# Patient Record
Sex: FEMALE | Race: WHITE | NOT HISPANIC OR LATINO | Employment: STUDENT | URBAN - METROPOLITAN AREA
[De-identification: names, ages, dates, MRNs, and addresses within clinical notes are randomized per-mention and may not be internally consistent; named-entity substitution may affect disease eponyms.]

---

## 2019-05-07 ENCOUNTER — OFFICE VISIT (OUTPATIENT)
Dept: GASTROENTEROLOGY | Facility: CLINIC | Age: 19
End: 2019-05-07

## 2019-05-07 VITALS
BODY MASS INDEX: 16.26 KG/M2 | WEIGHT: 101.2 LBS | HEIGHT: 66 IN | SYSTOLIC BLOOD PRESSURE: 116 MMHG | DIASTOLIC BLOOD PRESSURE: 68 MMHG

## 2019-05-07 DIAGNOSIS — K59.00 CONSTIPATION, UNSPECIFIED CONSTIPATION TYPE: ICD-10-CM

## 2019-05-07 DIAGNOSIS — R19.7 DIARRHEA, UNSPECIFIED TYPE: ICD-10-CM

## 2019-05-07 DIAGNOSIS — R10.84 GENERALIZED ABDOMINAL PAIN: Primary | ICD-10-CM

## 2019-05-07 DIAGNOSIS — R63.4 WEIGHT LOSS, ABNORMAL: ICD-10-CM

## 2019-05-07 PROCEDURE — 99204 OFFICE O/P NEW MOD 45 MIN: CPT | Performed by: INTERNAL MEDICINE

## 2019-05-07 RX ORDER — DICYCLOMINE HCL 20 MG
20 TABLET ORAL
Qty: 90 TABLET | Refills: 5 | Status: SHIPPED | OUTPATIENT
Start: 2019-05-07 | End: 2020-12-09

## 2019-05-07 NOTE — PROGRESS NOTES
"    PATIENT INFORMATION  Valerie Minor       - 2000    CHIEF COMPLAINT  Chief Complaint   Patient presents with   • Abdominal Pain   • Constipation   • Diarrhea       HISTORY OF PRESENT ILLNESS  HPI    20 yo with one month of intermittent crampy abdominal pain, diarrhea and blood in stool. She states she has had \"stomach issues my whole life\".  She was seen and evaluated by Dr. Roberts about a year ago for similar symptoms except for the blood in the stool.  She underwent an upper endoscopy and a colonoscopy last year.  This was essentially normal.  Random colon biopsies were normal and her small bowel biopsies were normal also.  She notes postprandial abdominal pain usually within minutes of eating.  Her BMI is 16.3 and was around the same range last year.  She states she eats normal but when getting a diet history it appears that she eats very little at times just an apple a day.  She did not follow-up with Dr. Roberts after her scopes.  She states she is just dealt with her symptoms.  In the past several months she is lost another 10 pounds.  There is some associated nausea but no vomiting.  Put on abx for sinus infection about one month ago.  Labs from pcp reviewed done yesterday:  Wbc elevated at 13.8,  Sed rate normal, tsh and celiac are normal. Liver enzymes are normal.  She denies any family history for colon cancer polyps or inflammatory bowel disease.  REVIEW OF SYSTEMS  Review of Systems   Constitutional: Positive for appetite change, fatigue and unexpected weight change.   HENT: Positive for postnasal drip, rhinorrhea and sinus pressure.    Gastrointestinal: Positive for abdominal distention, abdominal pain, anal bleeding, blood in stool, constipation, diarrhea, nausea, rectal pain and vomiting.   Musculoskeletal: Positive for back pain.   Allergic/Immunologic: Positive for food allergies.   All other systems reviewed and are negative.        ACTIVE PROBLEMS  Patient Active Problem List    " "Diagnosis   • Generalized abdominal pain [R10.84]   • Diarrhea [R19.7]   • Constipation [K59.00]         PAST MEDICAL HISTORY  History reviewed. No pertinent past medical history.      SURGICAL HISTORY  Past Surgical History:   Procedure Laterality Date   • COLONOSCOPY     • UPPER GASTROINTESTINAL ENDOSCOPY           FAMILY HISTORY  Family History   Problem Relation Age of Onset   • Colon cancer Neg Hx    • Colon polyps Neg Hx          SOCIAL HISTORY  Social History     Occupational History   • Not on file   Tobacco Use   • Smoking status: Never Smoker   • Smokeless tobacco: Never Used   Substance and Sexual Activity   • Alcohol use: Yes   • Drug use: Yes     Types: Marijuana   • Sexual activity: Not on file       Debilities/Disabilities Identified: None    Emotional Behavior: Appropriate    CURRENT MEDICATIONS    Current Outpatient Medications:   •  dicyclomine (BENTYL) 20 MG tablet, Take 1 tablet by mouth 3 (Three) Times a Day Before Meals., Disp: 90 tablet, Rfl: 5    ALLERGIES  Patient has no known allergies.    VITALS  Vitals:    05/07/19 1310   BP: 116/68   Weight: 45.9 kg (101 lb 3.2 oz)   Height: 167.6 cm (66\")       LAST RESULTS   No results found for any previous visit.     No results found.    PHYSICAL EXAM  Physical Exam   Constitutional: She is oriented to person, place, and time. She appears well-developed and well-nourished. No distress.   Very thin framed female   HENT:   Head: Normocephalic and atraumatic.   Mouth/Throat: Oropharynx is clear and moist.   Eyes: EOM are normal. Pupils are equal, round, and reactive to light.   Neck: Normal range of motion. No tracheal deviation present.   Cardiovascular: Normal rate, regular rhythm, normal heart sounds and intact distal pulses. Exam reveals no gallop and no friction rub.   No murmur heard.  Pulmonary/Chest: Effort normal and breath sounds normal. No stridor. No respiratory distress. She has no wheezes. She has no rales. She exhibits no tenderness. "   Abdominal: Soft. Bowel sounds are normal. She exhibits no distension. There is no tenderness. There is no rebound and no guarding.   Musculoskeletal: She exhibits no edema.   Lymphadenopathy:     She has no cervical adenopathy.   Neurological: She is alert and oriented to person, place, and time.   Skin: Skin is warm. She is not diaphoretic.   Psychiatric: She has a normal mood and affect. Her behavior is normal. Judgment and thought content normal.   Nursing note and vitals reviewed.      ASSESSMENT  Diagnoses and all orders for this visit:    Generalized abdominal pain  -     Cancel: High Sensitivity CRP  -     Cancel: Celiac Ab tTG DGP TIgA  -     Cancel: TSH  -     Stool Culture - Stool, Per Rectum  -     Cancel: Fecal Leukocytes - Stool, Per Rectum  -     Clostridium Difficile Toxin - Stool, Per Rectum  -     Clostridium Difficile Toxin, PCR - Stool, Per Rectum    Diarrhea, unspecified type  -     Cancel: High Sensitivity CRP  -     Cancel: Celiac Ab tTG DGP TIgA  -     Cancel: TSH  -     Stool Culture - Stool, Per Rectum  -     Cancel: Fecal Leukocytes - Stool, Per Rectum  -     Clostridium Difficile Toxin - Stool, Per Rectum  -     Clostridium Difficile Toxin, PCR - Stool, Per Rectum    Constipation, unspecified constipation type    Weight loss, abnormal    Other orders  -     dicyclomine (BENTYL) 20 MG tablet; Take 1 tablet by mouth 3 (Three) Times a Day Before Meals.          PLAN  No Follow-up on file.    Ensure tid    Stool studies    If her stool studies are negative we will need to repeat an upper endoscopy and a colonoscopy.

## 2019-05-09 ENCOUNTER — LAB (OUTPATIENT)
Dept: LAB | Facility: HOSPITAL | Age: 19
End: 2019-05-09

## 2019-05-09 DIAGNOSIS — R10.84 ABDOMINAL PAIN, GENERALIZED: Primary | ICD-10-CM

## 2019-05-09 DIAGNOSIS — R19.7 DIARRHEA OF PRESUMED INFECTIOUS ORIGIN: ICD-10-CM

## 2019-05-09 LAB
C DIFF TOX GENS STL QL NAA+PROBE: NEGATIVE
LACTOFERRIN STL QL LA: POSITIVE

## 2019-05-09 PROCEDURE — 83631 LACTOFERRIN FECAL (QUANT): CPT

## 2019-05-09 PROCEDURE — 87493 C DIFF AMPLIFIED PROBE: CPT | Performed by: INTERNAL MEDICINE

## 2019-05-09 PROCEDURE — 87046 STOOL CULTR AEROBIC BACT EA: CPT | Performed by: INTERNAL MEDICINE

## 2019-05-09 PROCEDURE — 87045 FECES CULTURE AEROBIC BACT: CPT | Performed by: INTERNAL MEDICINE

## 2019-05-10 NOTE — PROGRESS NOTES
Let her know fecal lactoferirn is positive, cl.diff is negative. Cultures are pending.. If negative, she needs to have egd and cls.

## 2019-05-13 ENCOUNTER — TELEPHONE (OUTPATIENT)
Dept: GASTROENTEROLOGY | Facility: CLINIC | Age: 19
End: 2019-05-13

## 2019-05-13 DIAGNOSIS — R19.7 DIARRHEA, UNSPECIFIED TYPE: ICD-10-CM

## 2019-05-13 DIAGNOSIS — R10.84 GENERALIZED ABDOMINAL PAIN: Primary | ICD-10-CM

## 2019-05-13 DIAGNOSIS — K59.00 CONSTIPATION, UNSPECIFIED CONSTIPATION TYPE: ICD-10-CM

## 2019-05-13 LAB
CAMPYLOBACTER STL CULT: NORMAL
E COLI SXT STL QL IA: NEGATIVE
Lab: NORMAL
Lab: NORMAL
SALM + SHIG STL CULT: NORMAL

## 2019-05-13 NOTE — TELEPHONE ENCOUNTER
Notes recorded by Arely Rubio MD on 5/13/2019 at 9:18 AM EDT  C.diff is negative, stool cx is negative, needs egd and cls.  Patient requested gatorade prep       Date: ____5/24/19_________ Arrival Time: ____10:15am_____    Hospital:  [facility]([address] [place], [place] [address], [place] [address]) (back of hospital at the emergency room)           Please buy the following:  • One 64oz or two 32oz bottle(s) of Gatorade or any other non-carbonated drink (NO RED OR PURPLE). You may buy sugar-free if you are diabetic. You may refrigerate if preferred.   • Dulcolax tablets (not suppository or stool softener - will need 6 tablets).  • Yelena lax 238 grams (8.3oz) powder or generic form polyethylene glycol 3350.  • One bottle of Infants’ Mylicon liquid ask pharmacist for substitute if not available.  SEVEN DAYS BEFORE STOP ASPIRIN, ADVIL, ALEVE, MOTRIN, IBU or anything listed on the back of this form.  The day prior to colonoscopy, you will need to follow a clear liquid diet.   Clear liquid diet requirements:  You may consume water, fruit juices, jello, clear broth or bouillon, popsicles, Sprite, sports drinks, etc. (no orange, grapefruit or V-8). Please consume plenty of clear liquids. AVOID: All solid foods, dairy products and anything red or purple. Limit coffee and tea.  The day prior to colonoscopy, begin prep as detailed below:  1) In AM, mix all Yelena lax, all Gatorade and 3 milliliters of the Mylicon drops (.3 x 10=3ml) Stir/shake contents until completely dissolved. Chill if preferred. DO NOT DRINK YET.  2) At 9AM, take 3 tablets of Dulcolax pills with a large glass of water.  3) At 11AM, start drinking one 8oz glass of the Gatorade/Yelena lax/Mylicon solution every 15 minutes until half of solution is gone.   4) At 5PM, drink other half of solution by drinking an 8oz glass every 15 minutes.  5) At 6PM, take last 3 tablets with a large glass of water.  6) You may have liquids up to 4 hours prior.  You may take your  morning heart, blood pressure, seizure, psych and breathing medications with a small sip of water. No gum or candy. No smoking or tobacco products  7) You will need someone to drive you home after your exam. No bus or uber allowed. The average time spent is around 2-3 hours. You are not to drive, operate machinery or make legal decisions the remainder of the day.  Helpful tips:  • Some people may develop nausea/vomiting during this prep. The best option for this is to stop drinking the solution for about 30 minutes, then resume drinking at a slower rate. It is important to drink entire contents of solution.  • Walking in between drinking each glass reduces bloating.  • If diabetic, use sugar-free drinks and monitor blood sugar closely to prevent low blood sugar.      Please call the office the morning of your procedure if your bowel movements are still solid. [phone] Jessica. If it is after hours or the weekend and you need to reach the provider or the office please call [phone].  Please call the office as soon as possible if you need to reschedule or cancel your procedure you must give two weeks’ notice.  If you do not show up or frequently reschedule your procedure your provider has the option of not rescheduling.   It is your responsibility to check with your insurance company to determine benefits and out of pocket costs.     Avoid these medications 7 days prior to surgery  Please check with your prescribing doctor before stopping any medications    NSAIDS- Advil, Aleve, Motrin, Ibuprofen, Midol, Excedrin, Fiorinal, Ayala-Wyatt  (Some cold medications may have these in them)    All herbal medications- iron, vitamin E and D, fish oil, decongestants (phenylephrine, pseudoephedrine), ginkgo, garlic, ginseng, Eladio’s wart    Mobic (meloxicam), Celebrex, Diclofenac (Voltaren), Nambumetone (Relafen), Daypro, Naproxen, Sulindac, Indomethacin, Toradol, Feldine, Salsalate, Etodolac (Lodine),     Viagra,  Cialis, Levitra    Aspirin, Plavix (clopidogrel), Effient, Pletal, Coumadin, Pradaxa, Brilinta, Ticlide, Eliquis, (Xaralto- 3 days)      Diet pills -Adipex (phentermine) -2 weeks prior

## 2019-05-23 ENCOUNTER — ANESTHESIA EVENT (OUTPATIENT)
Dept: PERIOP | Facility: HOSPITAL | Age: 19
End: 2019-05-23

## 2019-05-24 ENCOUNTER — HOSPITAL ENCOUNTER (OUTPATIENT)
Facility: HOSPITAL | Age: 19
Setting detail: HOSPITAL OUTPATIENT SURGERY
Discharge: HOME | End: 2019-05-24
Attending: INTERNAL MEDICINE | Admitting: INTERNAL MEDICINE

## 2019-05-24 ENCOUNTER — ANESTHESIA (OUTPATIENT)
Dept: PERIOP | Facility: HOSPITAL | Age: 19
End: 2019-05-24

## 2019-05-24 VITALS
BODY MASS INDEX: 15.67 KG/M2 | TEMPERATURE: 97.2 F | DIASTOLIC BLOOD PRESSURE: 75 MMHG | SYSTOLIC BLOOD PRESSURE: 120 MMHG | HEIGHT: 66 IN | OXYGEN SATURATION: 95 % | HEART RATE: 71 BPM | RESPIRATION RATE: 16 BRPM | WEIGHT: 97.5 LBS

## 2019-05-24 DIAGNOSIS — K59.00 CONSTIPATION, UNSPECIFIED CONSTIPATION TYPE: ICD-10-CM

## 2019-05-24 DIAGNOSIS — R10.84 GENERALIZED ABDOMINAL PAIN: ICD-10-CM

## 2019-05-24 DIAGNOSIS — R19.7 DIARRHEA, UNSPECIFIED TYPE: ICD-10-CM

## 2019-05-24 PROCEDURE — 88305 TISSUE EXAM BY PATHOLOGIST: CPT | Performed by: INTERNAL MEDICINE

## 2019-05-24 PROCEDURE — 45380 COLONOSCOPY AND BIOPSY: CPT | Performed by: INTERNAL MEDICINE

## 2019-05-24 PROCEDURE — 43239 EGD BIOPSY SINGLE/MULTIPLE: CPT | Performed by: INTERNAL MEDICINE

## 2019-05-24 PROCEDURE — 25010000002 PROPOFOL 10 MG/ML EMULSION: Performed by: NURSE ANESTHETIST, CERTIFIED REGISTERED

## 2019-05-24 RX ORDER — SODIUM CHLORIDE 9 MG/ML
40 INJECTION, SOLUTION INTRAVENOUS AS NEEDED
Status: DISCONTINUED | OUTPATIENT
Start: 2019-05-24 | End: 2019-05-24 | Stop reason: HOSPADM

## 2019-05-24 RX ORDER — SERTRALINE HYDROCHLORIDE 25 MG/1
25 TABLET, FILM COATED ORAL DAILY
COMMUNITY
End: 2020-12-09

## 2019-05-24 RX ORDER — SODIUM CHLORIDE 0.9 % (FLUSH) 0.9 %
1-10 SYRINGE (ML) INJECTION AS NEEDED
Status: DISCONTINUED | OUTPATIENT
Start: 2019-05-24 | End: 2019-05-24 | Stop reason: HOSPADM

## 2019-05-24 RX ORDER — LIDOCAINE HYDROCHLORIDE 10 MG/ML
0.5 INJECTION, SOLUTION EPIDURAL; INFILTRATION; INTRACAUDAL; PERINEURAL ONCE AS NEEDED
Status: COMPLETED | OUTPATIENT
Start: 2019-05-24 | End: 2019-05-24

## 2019-05-24 RX ORDER — SODIUM CHLORIDE, SODIUM LACTATE, POTASSIUM CHLORIDE, CALCIUM CHLORIDE 600; 310; 30; 20 MG/100ML; MG/100ML; MG/100ML; MG/100ML
9 INJECTION, SOLUTION INTRAVENOUS CONTINUOUS
Status: DISCONTINUED | OUTPATIENT
Start: 2019-05-24 | End: 2019-05-24 | Stop reason: HOSPADM

## 2019-05-24 RX ORDER — GLYCOPYRROLATE 0.2 MG/ML
INJECTION INTRAMUSCULAR; INTRAVENOUS AS NEEDED
Status: DISCONTINUED | OUTPATIENT
Start: 2019-05-24 | End: 2019-05-24 | Stop reason: SURG

## 2019-05-24 RX ORDER — PROPOFOL 10 MG/ML
VIAL (ML) INTRAVENOUS CONTINUOUS PRN
Status: DISCONTINUED | OUTPATIENT
Start: 2019-05-24 | End: 2019-05-24 | Stop reason: SURG

## 2019-05-24 RX ORDER — PROPOFOL 10 MG/ML
VIAL (ML) INTRAVENOUS AS NEEDED
Status: DISCONTINUED | OUTPATIENT
Start: 2019-05-24 | End: 2019-05-24 | Stop reason: SURG

## 2019-05-24 RX ORDER — LIDOCAINE HYDROCHLORIDE 10 MG/ML
INJECTION, SOLUTION INFILTRATION; PERINEURAL AS NEEDED
Status: DISCONTINUED | OUTPATIENT
Start: 2019-05-24 | End: 2019-05-24 | Stop reason: SURG

## 2019-05-24 RX ADMIN — PROPOFOL 50 MG: 10 INJECTION, EMULSION INTRAVENOUS at 10:58

## 2019-05-24 RX ADMIN — LIDOCAINE HYDROCHLORIDE 50 MG: 10 INJECTION, SOLUTION INFILTRATION; PERINEURAL at 10:32

## 2019-05-24 RX ADMIN — PROPOFOL 50 MG: 10 INJECTION, EMULSION INTRAVENOUS at 10:51

## 2019-05-24 RX ADMIN — PROPOFOL 50 MG: 10 INJECTION, EMULSION INTRAVENOUS at 10:45

## 2019-05-24 RX ADMIN — PROPOFOL 40 MG: 10 INJECTION, EMULSION INTRAVENOUS at 10:40

## 2019-05-24 RX ADMIN — PROPOFOL 100 MCG/KG/MIN: 10 INJECTION, EMULSION INTRAVENOUS at 10:33

## 2019-05-24 RX ADMIN — PROPOFOL 50 MG: 10 INJECTION, EMULSION INTRAVENOUS at 11:07

## 2019-05-24 RX ADMIN — PROPOFOL 50 MG: 10 INJECTION, EMULSION INTRAVENOUS at 10:38

## 2019-05-24 RX ADMIN — PROPOFOL 50 MG: 10 INJECTION, EMULSION INTRAVENOUS at 10:33

## 2019-05-24 RX ADMIN — SODIUM CHLORIDE, POTASSIUM CHLORIDE, SODIUM LACTATE AND CALCIUM CHLORIDE: 600; 310; 30; 20 INJECTION, SOLUTION INTRAVENOUS at 10:30

## 2019-05-24 RX ADMIN — LIDOCAINE HYDROCHLORIDE 0.5 ML: 10 INJECTION, SOLUTION EPIDURAL; INFILTRATION; INTRACAUDAL; PERINEURAL at 09:30

## 2019-05-24 RX ADMIN — PROPOFOL 40 MG: 10 INJECTION, EMULSION INTRAVENOUS at 11:01

## 2019-05-24 RX ADMIN — SODIUM CHLORIDE, POTASSIUM CHLORIDE, SODIUM LACTATE AND CALCIUM CHLORIDE 9 ML/HR: 600; 310; 30; 20 INJECTION, SOLUTION INTRAVENOUS at 09:30

## 2019-05-24 RX ADMIN — GLYCOPYRROLATE 0.1 MG: 0.2 INJECTION INTRAMUSCULAR; INTRAVENOUS at 10:33

## 2019-05-24 NOTE — ANESTHESIA POSTPROCEDURE EVALUATION
Patient: Valerie Minor    Procedure Summary     Date:  05/24/19 Room / Location:  [facility] ENDOSCOPY 2 /  [facility] OR    Anesthesia Start:  1030 Anesthesia Stop:  1111    Procedures:       ESOPHAGOGASTRODUODENOSCOPY with biopsies (N/A Esophagus)      COLONOSCOPY with biopsies (N/A ) Diagnosis:       Generalized abdominal pain      Diarrhea, unspecified type      Constipation, unspecified constipation type      (Generalized abdominal pain [R10.84])      (Diarrhea, unspecified type [R19.7])      (Constipation, unspecified constipation type [K59.00])    Surgeon:  Arely Rubio MD Provider:  Karel Gay CRNA    Anesthesia Type:  MAC ASA Status:  2          Anesthesia Type: MAC  Last vitals  BP   117/88 (05/24/19 1130)   Temp   97.2 °F (36.2 °C) (05/24/19 1116)   Pulse   81 (05/24/19 1130)   Resp   16 (05/24/19 1130)     SpO2   100 % (05/24/19 1130)     Post Anesthesia Care and Evaluation    Patient location during evaluation: PHASE II  Patient participation: complete - patient participated  Level of consciousness: awake  Pain management: adequate  Airway patency: patent  Anesthetic complications: No anesthetic complications  PONV Status: none  Cardiovascular status: acceptable  Respiratory status: acceptable  Hydration status: acceptable

## 2019-05-24 NOTE — ANESTHESIA PREPROCEDURE EVALUATION
Anesthesia Evaluation     Patient summary reviewed and Nursing notes reviewed   no history of anesthetic complications:  NPO Solid Status: > 8 hours  NPO Liquid Status: > 4 hours           Airway   Mallampati: I  TM distance: >3 FB  Neck ROM: full  No difficulty expected  Dental - normal exam     Comment: Braces    Pulmonary - normal exam    breath sounds clear to auscultation  (+) a smoker Current,   Cardiovascular - negative cardio ROS and normal exam    Rhythm: regular  Rate: normal        Neuro/Psych- negative ROS  GI/Hepatic/Renal/Endo      ROS Comment: IBS    Musculoskeletal (-) negative ROS    Abdominal  - normal exam   Substance History   (+) drug use (MJ last used 2 weeks ago)     OB/GYN negative ob/gyn ROS         Other - negative ROS                     Anesthesia Plan    ASA 2     MAC     intravenous induction   Anesthetic plan, all risks, benefits, and alternatives have been provided, discussed and informed consent has been obtained with: patient.  Use of blood products discussed with patient  Consented to blood products.

## 2019-05-28 LAB
CYTO UR: NORMAL
LAB AP CASE REPORT: NORMAL
LAB AP DIAGNOSIS COMMENT: NORMAL
PATH REPORT.FINAL DX SPEC: NORMAL
PATH REPORT.GROSS SPEC: NORMAL

## 2019-06-06 ENCOUNTER — OFFICE VISIT (OUTPATIENT)
Dept: GASTROENTEROLOGY | Facility: CLINIC | Age: 19
End: 2019-06-06

## 2019-06-06 VITALS
BODY MASS INDEX: 17.45 KG/M2 | WEIGHT: 108.6 LBS | SYSTOLIC BLOOD PRESSURE: 116 MMHG | DIASTOLIC BLOOD PRESSURE: 66 MMHG | HEIGHT: 66 IN

## 2019-06-06 DIAGNOSIS — R63.4 WEIGHT LOSS: ICD-10-CM

## 2019-06-06 DIAGNOSIS — R19.7 DIARRHEA, UNSPECIFIED TYPE: Primary | ICD-10-CM

## 2019-06-06 DIAGNOSIS — R10.84 GENERALIZED ABDOMINAL PAIN: ICD-10-CM

## 2019-06-06 PROCEDURE — 99214 OFFICE O/P EST MOD 30 MIN: CPT | Performed by: INTERNAL MEDICINE

## 2019-06-06 NOTE — PROGRESS NOTES
PATIENT INFORMATION  Valerie Minor       - 2000    CHIEF COMPLAINT  Chief Complaint   Patient presents with   • Follow-up     4 week follow up on Abd Pain       HISTORY OF PRESENT ILLNESS  HPI  She is here in follow up and is feeling better. No abdominal pain. Weight is up and eating better.  EGD and CLS reviewed with her and show:  1.  Small Bowel Biopsy:  Benign small bowel mucosa with               A. Normal intact villous surface.               B. No significant inflammation, no granulomas.               C. No viral inclusions or other organisms on routinely stained sections.      2.  Stomach, Body, Biopsy:                A.  Benign gastric oxyntic mucosa without diagnostic abnormality.                B.  Negative for Helicobacter by routine staining.                  3.  Terminal Ileum, Biopsy:  Benign small bowel mucosa with               A. Normal intact villous surface.               B. No significant inflammation, no granulomas.               C. No viral inclusions or other organisms on routinely stained sections.     4.  Colon, Random, Biopsy: Benign colon with               A.  Rare focus of cryptitis.               B.  No viral inclusions, micro-organisms or architectural distortion identified.                C.  Prominent lymphoid aggregate.               D.  Negative for dysplasia.     bm are formed 1-2 times daily. She has not stopped zoloft.  She is not taking bentyl      REVIEW OF SYSTEMS  Review of Systems   All other systems reviewed and are negative.        ACTIVE PROBLEMS  Patient Active Problem List    Diagnosis   • Generalized abdominal pain [R10.84]   • Diarrhea [R19.7]   • Constipation [K59.00]         PAST MEDICAL HISTORY  Past Medical History:   Diagnosis Date   • Abdominal pain    • Anxiety    • Blood in stool          SURGICAL HISTORY  Past Surgical History:   Procedure Laterality Date   • COLONOSCOPY     • COLONOSCOPY N/A 2019    Procedure: COLONOSCOPY with biopsies;   "Surgeon: Arely Rubio MD;  Location: [facility] OR;  Service: Gastroenterology   • ENDOSCOPY N/A 5/24/2019    Procedure: ESOPHAGOGASTRODUODENOSCOPY with biopsies;  Surgeon: Arely Rubio MD;  Location: [facility] OR;  Service: Gastroenterology   • UPPER GASTROINTESTINAL ENDOSCOPY           FAMILY HISTORY  Family History   Problem Relation Age of Onset   • Colon cancer Neg Hx    • Colon polyps Neg Hx          SOCIAL HISTORY  Social History     Occupational History   • Not on file   Tobacco Use   • Smoking status: Never Smoker   • Smokeless tobacco: Never Used   Substance and Sexual Activity   • Alcohol use: Yes     Comment: rare   • Drug use: Yes     Types: Marijuana     Comment: last use 5/10/19   • Sexual activity: Defer       Debilities/Disabilities Identified: None    Emotional Behavior: Appropriate    CURRENT MEDICATIONS    Current Outpatient Medications:   •  dicyclomine (BENTYL) 20 MG tablet, Take 1 tablet by mouth 3 (Three) Times a Day Before Meals., Disp: 90 tablet, Rfl: 5  •  sertraline (ZOLOFT) 25 MG tablet, Take 25 mg by mouth Daily., Disp: , Rfl:     ALLERGIES  Chlorhexidine    VITALS  Vitals:    06/06/19 0959   BP: 116/66   Weight: 49.3 kg (108 lb 9.6 oz)   Height: 167.6 cm (65.98\")       LAST RESULTS   Admission on 05/24/2019, Discharged on 05/24/2019   Component Date Value Ref Range Status   • Case Report 05/24/2019    Final                    Value:Surgical Pathology Report                         Case: [id number]                                  Authorizing Provider:  Arely Rubio MD         Collected:           05/24/2019 10:33 AM          Ordering Location:     [facility]   Received:            05/24/2019 12:42 PM                                 OR                                                                           Pathologist:           Sumi Eden MD                                                    Specimens:   1) - Small Intestine, Small bowel biopsy             "                                                2) - Gastric, Body                                                                                  3) - Small Intestine, Terminal ileum biopsy                                                         4) - Large Intestine, Random Large Intestine biopsy                                       • Final Diagnosis 05/24/2019    Final                    Value:This result contains rich text formatting which cannot be displayed here.   • Comment 05/24/2019    Final                    Value:This result contains rich text formatting which cannot be displayed here.   • Gross Description 05/24/2019    Final                    Value:This result contains rich text formatting which cannot be displayed here.   • Microscopic Description 05/24/2019    Final                    Value:This result contains rich text formatting which cannot be displayed here.     No results found.    PHYSICAL EXAM  Physical Exam   Constitutional: She is oriented to person, place, and time. She appears well-developed and well-nourished. No distress.   HENT:   Head: Normocephalic and atraumatic.   Mouth/Throat: Oropharynx is clear and moist.   Eyes: EOM are normal. Pupils are equal, round, and reactive to light.   Neck: Normal range of motion. No tracheal deviation present.   Cardiovascular: Normal rate, regular rhythm, normal heart sounds and intact distal pulses. Exam reveals no gallop and no friction rub.   No murmur heard.  Pulmonary/Chest: Effort normal and breath sounds normal. No stridor. No respiratory distress. She has no wheezes. She has no rales. She exhibits no tenderness.   Abdominal: Soft. Bowel sounds are normal. She exhibits no distension. There is no tenderness. There is no rebound and no guarding.   Musculoskeletal: She exhibits no edema.   Lymphadenopathy:     She has no cervical adenopathy.   Neurological: She is alert and oriented to person, place, and time.   Skin: Skin is warm. She is not  diaphoretic.   Psychiatric: She has a normal mood and affect. Her behavior is normal. Judgment and thought content normal.   Nursing note and vitals reviewed.      ASSESSMENT  Diagnoses and all orders for this visit:    Diarrhea, unspecified type  -     FL small bowel follow through; Future    Generalized abdominal pain  -     FL small bowel follow through; Future    Weight loss  -     FL small bowel follow through; Future          PLAN  No Follow-up on file.    Will get sbft and ibd 7 serology    May  Need capsule endoscopy

## 2021-02-04 ENCOUNTER — TELEPHONE (OUTPATIENT)
Dept: INTERNAL MEDICINE | Facility: CLINIC | Age: 21
End: 2021-02-04

## 2021-02-04 ENCOUNTER — OFFICE VISIT (OUTPATIENT)
Dept: INTERNAL MEDICINE | Facility: CLINIC | Age: 21
End: 2021-02-04

## 2021-02-04 VITALS — BODY MASS INDEX: 17.58 KG/M2 | HEIGHT: 67 IN | WEIGHT: 112 LBS

## 2021-02-04 DIAGNOSIS — J01.40 ACUTE NON-RECURRENT PANSINUSITIS: Primary | ICD-10-CM

## 2021-02-04 PROCEDURE — 99441 PR PHYS/QHP TELEPHONE EVALUATION 5-10 MIN: CPT | Performed by: INTERNAL MEDICINE

## 2021-02-04 RX ORDER — AMOXICILLIN AND CLAVULANATE POTASSIUM 875; 125 MG/1; MG/1
1 TABLET, FILM COATED ORAL 2 TIMES DAILY
Qty: 14 TABLET | Refills: 0 | Status: SHIPPED | OUTPATIENT
Start: 2021-02-04 | End: 2021-04-07

## 2021-02-04 NOTE — TELEPHONE ENCOUNTER
Patient advised that she has a lot of sinus pressure. Patient is requesting an antibiotic. Patient has been taking sudafed mucas relief and it has helped a little, but she still has a lot of pressure.     [facility]/pharmacy #[id number] - [place], [place] - [address] AT [facility] - [phone] [facility] [phone] FX

## 2021-02-04 NOTE — PROGRESS NOTES
"Chief Complaint  Sinusitis     You have chosen to receive care through a telephone visit. Do you consent to use a telephone visit for your medical care today? Yes    Subjective          Valerie Minor presents to [facility] for possible sinus infection. Has been having sinus issues for several months, facial pressure and pain worsened x 3 days. Very congested, heavy post-nasal drip. + jaw pain. No fever. + cough productive of mucus drainage. No SOB.     Objective   Vital Signs:     Ht 170.2 cm (67\")   Wt 50.8 kg (112 lb)   BMI 17.54 kg/m²            Assessment and Plan      Diagnoses and all orders for this visit:    1. Acute non-recurrent pansinusitis (Primary)   - will start abx as below for acute sinusitis   - cont decongestant routinely   - add nasal corticosteroid, can do afrin x 3 days then instructed to stop   - OTC ibuprofen and tylenol ok for symptom management     Orders:  -     amoxicillin-clavulanate (Augmentin) 875-125 MG per tablet; Take 1 tablet by mouth 2 (Two) Times a Day.  Dispense: 14 tablet; Refill: 0    I spent 7 minutes caring for Valerie on this date of service. This time includes time spent by me in the following activities:teleconference    Follow Up   Return if symptoms worsen or fail to improve.    Patient was given instructions and counseling regarding her condition or for health maintenance advice. Please see specific information pulled into the AVS if appropriate.     Lennie Riggins MD  [facility]  Phone: [phone]  Fax: [phone]    "

## 2021-02-09 ENCOUNTER — TELEPHONE (OUTPATIENT)
Dept: INTERNAL MEDICINE | Facility: CLINIC | Age: 21
End: 2021-02-09

## 2021-02-09 RX ORDER — LEVOFLOXACIN 500 MG/1
500 TABLET, FILM COATED ORAL DAILY
Qty: 5 TABLET | Refills: 0 | OUTPATIENT
Start: 2021-02-09 | End: 2021-05-03

## 2021-02-09 NOTE — TELEPHONE ENCOUNTER
Can we call and verify what OTC meds she is taking and how often? It can sometimes take the abx a little to kick in so not ready to jump ship on that. Any chance she could have COVID? Any known exposures?

## 2021-02-09 NOTE — TELEPHONE ENCOUNTER
Ok, have sent a new abx for her to try, should stop the other one and take this instead, if not feeling better by beginning of next week I want to do a video visit to try to sort this out and figure out what else might be going on

## 2021-02-09 NOTE — TELEPHONE ENCOUNTER
PATIENT WANTED TO RELAY TO HER PCP THAT HER SINUS PRESSURE AND INFECTION HAS NOT EASED. THE ANTIBIOTICS AND OTC MEDICATIONS HELP, BUT THE RELIEF DOES NOT LAST LONG AND THE CONGESTION IS LINGERING. PATIENT WOULD LIKE A CALL BACK FROM CLINICAL STAFF TO DISCUSS HER NEXT OPTIONS. PATIENT WILL BE IN CLASS UNTIL 3 P.M., SO PLEASE TRY TO REACH HER AFTER THEN. PATIENT CAN BE REACHED [phone].

## 2021-02-09 NOTE — TELEPHONE ENCOUNTER
Sudafed, nasocort, afrin, breath right strips, not taking very often. States that these symptoms have been going on for a month so probably not COVID, no known exposures

## 2021-10-08 ENCOUNTER — TELEPHONE (OUTPATIENT)
Dept: INTERNAL MEDICINE | Facility: CLINIC | Age: 21
End: 2021-10-08

## 2021-10-08 NOTE — TELEPHONE ENCOUNTER
Caller: Valerie Minor    Relationship to patient: Self    Best call back number:[phone]     Date of positive COVID19 test: 10/04/2021    COVID19 symptoms: SUPER STUFFY, HEADACHES, COUGH, AND CONGESTION.    Date of initial quarantine: 10/04/2021, BUT WAS ALONE AT HOME 10/02/21 AND 10/03/21 AS WELL.    Additional information or concerns: PATIENT IS WANTING TO KNOW HOW LONG SHE WILL NEED TO QUARANTINE.  PLEASE ADVISE.

## 2021-10-11 ENCOUNTER — TELEPHONE (OUTPATIENT)
Dept: INTERNAL MEDICINE | Facility: CLINIC | Age: 21
End: 2021-10-11

## 2021-10-11 NOTE — TELEPHONE ENCOUNTER
Caller: Valerie Minor    Relationship: Self    Best call back number: [phone]    What is the best time to reach you: ANY TIME    Who are you requesting to speak with (clinical staff, provider,  specific staff member): CLINICAL STAFF    What was the call regarding: PATIENT TESTED POSITIVE FOR COVID LAST Monday 10/4/21, BUT SHE TESTED NEGATIVE TODAY 10/11/21. PATIENT WANTS TO KNOW IF SHE NEEDS TO CONTINUE TO QUARANTINE FOR HER ORIGINAL TIME FRAME OR IF SHE IS GOOD TO COME OUT OF QUARANTINE SINCE SHE GOT A NEGATIVE TEST RESULT.    PLEASE ADVISE    Do you require a callback: YES

## 2022-02-11 ENCOUNTER — TELEPHONE (OUTPATIENT)
Dept: URGENT CARE | Facility: CLINIC | Age: 22
End: 2022-02-11

## 2022-02-11 NOTE — TELEPHONE ENCOUNTER
D/w pt.  Requesting oral (nos) rx for ringworm.  No relief w/ terbinafine.  Was advised to f/u w/ derm.  P - f/u w/ derm as advised or call JOSHUA Mabry PA-C tomorrow.   Attending MD Leanne Almendarez:  I personally have seen and examined this patient.  Resident note reviewed and agree on plan of care and except where noted.  See HPI, PE, and MDM for details.

## 2022-02-18 ENCOUNTER — OFFICE VISIT (OUTPATIENT)
Dept: INTERNAL MEDICINE | Facility: CLINIC | Age: 22
End: 2022-02-18

## 2022-02-18 VITALS
TEMPERATURE: 97.1 F | HEIGHT: 67 IN | DIASTOLIC BLOOD PRESSURE: 68 MMHG | SYSTOLIC BLOOD PRESSURE: 110 MMHG | BODY MASS INDEX: 18.52 KG/M2 | WEIGHT: 118 LBS | OXYGEN SATURATION: 98 % | HEART RATE: 76 BPM | RESPIRATION RATE: 16 BRPM

## 2022-02-18 DIAGNOSIS — F41.9 ANXIETY: ICD-10-CM

## 2022-02-18 DIAGNOSIS — L42 PITYRIASIS ROSEA: Primary | ICD-10-CM

## 2022-02-18 PROBLEM — R46.89: Status: ACTIVE | Noted: 2019-10-02

## 2022-02-18 PROBLEM — K59.00 CONSTIPATION: Status: RESOLVED | Noted: 2019-05-13 | Resolved: 2022-02-18

## 2022-02-18 PROBLEM — R19.7 DIARRHEA: Status: RESOLVED | Noted: 2019-05-13 | Resolved: 2022-02-18

## 2022-02-18 PROBLEM — R10.84 GENERALIZED ABDOMINAL PAIN: Status: RESOLVED | Noted: 2019-05-13 | Resolved: 2022-02-18

## 2022-02-18 PROCEDURE — 99214 OFFICE O/P EST MOD 30 MIN: CPT | Performed by: INTERNAL MEDICINE

## 2022-02-18 NOTE — ASSESSMENT & PLAN NOTE
UNCONTROLLED  - will restart pt on sertraline 50 mg, has been on this in the past with some success but has been off x 1-2 years  - Discussed potential side effects of this medication with patient including insomnia, changes in sexual performance, weight and appetite changes, fatigue, nausea, dizziness. Reviewed that medication will likely not be fully beneficial for 3-4 weeks, so encouraged pt to give  medication a full month before making decision to change or stop med.

## 2022-02-18 NOTE — PROGRESS NOTES
"Chief Complaint  Rash    Subjective          Valerie Minor presents to [facility] for rash. She is worried that she has ringworm. First noticed spots on her stomach about 3 weeks ago,  Had several locations. Does feel like it is better now than it was before. She went to [facility] and was given a topical medication which has helped some but still with unresolved spots. Not itchy.     Objective   Vital Signs:     /68   Pulse 76   Temp 97.1 °F (36.2 °C)   Resp 16   Ht 170.2 cm (67\")   Wt 53.5 kg (118 lb)   SpO2 98%   BMI 18.48 kg/m²     Physical Exam  Vitals and nursing note reviewed.   Constitutional:       General: She is not in acute distress.     Appearance: Normal appearance.   Pulmonary:      Effort: Pulmonary effort is normal. No respiratory distress.   Skin:     Comments: Several healing larger annular lesion with thin white scale present on abd over RLQ, scattered erythematous macules with thin scale over trunk and upper extremities in various stages of resolution   Neurological:      Mental Status: She is alert and oriented to person, place, and time. Mental status is at baseline.   Psychiatric:         Mood and Affect: Mood is anxious. Mood is not depressed. Affect is not tearful.         Speech: Speech normal.         Behavior: Behavior normal. Behavior is cooperative.         Thought Content: Thought content normal.         Judgment: Judgment normal.          Result Review : : None     Assessment and Plan      Diagnoses and all orders for this visit:    1. Pityriasis rosea (Primary)   - supportive care, no need for creams or ointments   - can use benadryl OTC for itch as needed    2. Anxiety  Assessment & Plan:  UNCONTROLLED  - will restart pt on sertraline 50 mg, has been on this in the past with some success but has been off x 1-2 years  - Discussed potential side effects of this medication with patient including insomnia, changes in sexual " performance, weight and appetite changes, fatigue, nausea, dizziness. Reviewed that medication will likely not be fully beneficial for 3-4 weeks, so encouraged pt to give  medication a full month before making decision to change or stop med.       Orders:  -     sertraline (Zoloft) 50 MG tablet; Take 1 tablet by mouth Daily.  Dispense: 30 tablet; Refill: 1        Follow Up   Return in about 4 weeks (around 3/18/2022) for follow up anxiety.    Patient was given instructions and counseling regarding her condition or for health maintenance advice. Please see specific information pulled into the AVS if appropriate.     Lennie Riggins MD  [facility]  Phone: [phone]  Fax: [phone]

## 2022-05-17 ENCOUNTER — TELEPHONE (OUTPATIENT)
Dept: INTERNAL MEDICINE | Facility: CLINIC | Age: 22
End: 2022-05-17

## 2022-05-17 NOTE — TELEPHONE ENCOUNTER
Caller: Valerie Minor    Relationship to patient: Self    Best call back number: [phone]    Chief complaint: PHYSICAL    Type of visit:PHYSICAL /EMPLOYEMENT PHYSICAL FOR [employer]    Requested date: ASAP    If rescheduling, when is the original appointment: N/A    Additional notes:PATIENT WILL SEE ANY PROVIDER BUT NEEDS PHYSICAL BY THE END OF THE MONTH    OR SUGGEST WHERE TO GO

## 2022-05-17 NOTE — TELEPHONE ENCOUNTER
Pt informed; no physical appts by the end of the month advised to head to urgent care or [facility] clinic

## 2022-07-07 ENCOUNTER — OFFICE VISIT (OUTPATIENT)
Dept: INTERNAL MEDICINE | Facility: CLINIC | Age: 22
End: 2022-07-07

## 2022-07-07 VITALS
OXYGEN SATURATION: 98 % | SYSTOLIC BLOOD PRESSURE: 102 MMHG | HEART RATE: 62 BPM | BODY MASS INDEX: 17.27 KG/M2 | RESPIRATION RATE: 18 BRPM | HEIGHT: 67 IN | WEIGHT: 110 LBS | DIASTOLIC BLOOD PRESSURE: 60 MMHG | TEMPERATURE: 98.4 F

## 2022-07-07 DIAGNOSIS — R62.7 POOR WEIGHT GAIN IN ADULT: Primary | ICD-10-CM

## 2022-07-07 DIAGNOSIS — L02.91 ABSCESS: ICD-10-CM

## 2022-07-07 PROCEDURE — 99214 OFFICE O/P EST MOD 30 MIN: CPT | Performed by: INTERNAL MEDICINE

## 2022-07-07 RX ORDER — SULFAMETHOXAZOLE AND TRIMETHOPRIM 800; 160 MG/1; MG/1
1 TABLET ORAL 2 TIMES DAILY
Qty: 14 TABLET | Refills: 0 | Status: SHIPPED | OUTPATIENT
Start: 2022-07-07 | End: 2022-07-14

## 2022-07-07 NOTE — PROGRESS NOTES
"Chief Complaint  Abscess (Right buttock )    Subjective        Valerie Minor presents to [facility]  Here with 1 month of large pimple on right upper posterior thigh near buttocks; redness, did have friends try to drain a few times and she states had some discharge of pus though none recently; no fevers; no injury    Also with poor weight gain, states she needs to eat a high calorie diet to maintain her weight; no diarrhea or stool issues      Objective   Vital Signs:  /60   Pulse 62   Temp 98.4 °F (36.9 °C)   Resp 18   Ht 170.2 cm (67\")   Wt 49.9 kg (110 lb)   SpO2 98%   BMI 17.23 kg/m²   Estimated body mass index is 17.23 kg/m² as calculated from the following:    Height as of this encounter: 170.2 cm (67\").    Weight as of this encounter: 49.9 kg (110 lb).          Physical Exam  Vitals and nursing note reviewed.   Constitutional:       Appearance: Normal appearance.   HENT:      Head: Normocephalic and atraumatic.      Right Ear: External ear normal.      Left Ear: External ear normal.      Nose: Nose normal.      Mouth/Throat:      Mouth: Mucous membranes are moist.      Pharynx: Oropharynx is clear.   Eyes:      Extraocular Movements: Extraocular movements intact.      Conjunctiva/sclera: Conjunctivae normal.   Pulmonary:      Effort: Pulmonary effort is normal. No respiratory distress.   Musculoskeletal:         General: Normal range of motion.      Cervical back: Normal range of motion.      Comments: Right upper medial thigh with large 4cm area of induration, mild redness, no fluctuance, no head; exam supervised by Carolyn Pierce   Skin:     Findings: No rash.   Neurological:      General: No focal deficit present.      Mental Status: She is alert. Mental status is at baseline.   Psychiatric:         Mood and Affect: Mood normal.         Behavior: Behavior normal.         Thought Content: Thought content normal.         Judgment: Judgment normal. "        Result Review :                Assessment and Plan   Diagnoses and all orders for this visit:    1. Poor weight gain in adult (Primary)  -     CBC w AUTO Differential  -     Comprehensive metabolic panel  -     TSH  -     Celiac Disease Panel  -     Iron and TIBC  -     Ferritin  -     Vitamin B12    2. Abscess    Other orders  -     sulfamethoxazole-trimethoprim (Bactrim DS) 800-160 MG per tablet; Take 1 tablet by mouth 2 (Two) Times a Day for 7 days.  Dispense: 14 tablet; Refill: 0    - referral to general surgery for abscess drainage, suspect an aspect of loculation and large size  - given rx for bactrim to use if unable to get surgery appt today or tomorrow; would prefer accurate culture data if able  - check labs as above for evaluation, consider EGD/colonoscopy pending above  - counseled on risks/benefits/alternatives  - rtc to follow up pending above         Follow Up   No follow-ups on file.  Patient was given instructions and counseling regarding her condition or for health maintenance advice. Please see specific information pulled into the AVS if appropriate.

## 2022-07-09 LAB
ALBUMIN SERPL-MCNC: 4.9 G/DL (ref 3.9–5)
ALBUMIN/GLOB SERPL: 2.2 {RATIO} (ref 1.2–2.2)
ALP SERPL-CCNC: 61 IU/L (ref 44–121)
ALT SERPL-CCNC: 8 IU/L (ref 0–32)
AST SERPL-CCNC: 11 IU/L (ref 0–40)
BASOPHILS # BLD AUTO: 0.1 X10E3/UL (ref 0–0.2)
BASOPHILS NFR BLD AUTO: 1 %
BILIRUB SERPL-MCNC: 0.3 MG/DL (ref 0–1.2)
BUN SERPL-MCNC: 10 MG/DL (ref 6–20)
BUN/CREAT SERPL: 11 (ref 9–23)
CALCIUM SERPL-MCNC: 9.6 MG/DL (ref 8.7–10.2)
CHLORIDE SERPL-SCNC: 105 MMOL/L (ref 96–106)
CO2 SERPL-SCNC: 22 MMOL/L (ref 20–29)
CREAT SERPL-MCNC: 0.88 MG/DL (ref 0.57–1)
EGFRCR SERPLBLD CKD-EPI 2021: 95 ML/MIN/1.73
ENDOMYSIUM IGA SER QL: NEGATIVE
EOSINOPHIL # BLD AUTO: 0.1 X10E3/UL (ref 0–0.4)
EOSINOPHIL NFR BLD AUTO: 1 %
ERYTHROCYTE [DISTWIDTH] IN BLOOD BY AUTOMATED COUNT: 11.7 % (ref 11.7–15.4)
FERRITIN SERPL-MCNC: 52 NG/ML (ref 15–150)
GLOBULIN SER CALC-MCNC: 2.2 G/DL (ref 1.5–4.5)
GLUCOSE SERPL-MCNC: 84 MG/DL (ref 65–99)
HCT VFR BLD AUTO: 37.7 % (ref 34–46.6)
HGB BLD-MCNC: 12.8 G/DL (ref 11.1–15.9)
IGA SERPL-MCNC: 240 MG/DL (ref 87–352)
IMM GRANULOCYTES # BLD AUTO: 0 X10E3/UL (ref 0–0.1)
IMM GRANULOCYTES NFR BLD AUTO: 0 %
IRON SATN MFR SERPL: 15 % (ref 15–55)
IRON SERPL-MCNC: 48 UG/DL (ref 27–159)
LYMPHOCYTES # BLD AUTO: 2.8 X10E3/UL (ref 0.7–3.1)
LYMPHOCYTES NFR BLD AUTO: 26 %
MCH RBC QN AUTO: 30.5 PG (ref 26.6–33)
MCHC RBC AUTO-ENTMCNC: 34 G/DL (ref 31.5–35.7)
MCV RBC AUTO: 90 FL (ref 79–97)
MONOCYTES # BLD AUTO: 0.9 X10E3/UL (ref 0.1–0.9)
MONOCYTES NFR BLD AUTO: 9 %
NEUTROPHILS # BLD AUTO: 6.7 X10E3/UL (ref 1.4–7)
NEUTROPHILS NFR BLD AUTO: 63 %
PLATELET # BLD AUTO: 398 X10E3/UL (ref 150–450)
POTASSIUM SERPL-SCNC: 4.1 MMOL/L (ref 3.5–5.2)
PROT SERPL-MCNC: 7.1 G/DL (ref 6–8.5)
RBC # BLD AUTO: 4.2 X10E6/UL (ref 3.77–5.28)
SODIUM SERPL-SCNC: 141 MMOL/L (ref 134–144)
TIBC SERPL-MCNC: 321 UG/DL (ref 250–450)
TSH SERPL DL<=0.005 MIU/L-ACNC: 0.93 UIU/ML (ref 0.45–4.5)
TTG IGA SER-ACNC: <2 U/ML (ref 0–3)
UIBC SERPL-MCNC: 273 UG/DL (ref 131–425)
VIT B12 SERPL-MCNC: 316 PG/ML (ref 232–1245)
WBC # BLD AUTO: 10.6 X10E3/UL (ref 3.4–10.8)

## 2022-07-12 ENCOUNTER — OFFICE VISIT (OUTPATIENT)
Dept: SURGERY | Facility: CLINIC | Age: 22
End: 2022-07-12

## 2022-07-12 VITALS
WEIGHT: 112 LBS | SYSTOLIC BLOOD PRESSURE: 106 MMHG | HEIGHT: 67 IN | DIASTOLIC BLOOD PRESSURE: 64 MMHG | BODY MASS INDEX: 17.58 KG/M2

## 2022-07-12 DIAGNOSIS — L72.3 INFECTED SEBACEOUS CYST: Primary | ICD-10-CM

## 2022-07-12 DIAGNOSIS — L08.9 INFECTED SEBACEOUS CYST: Primary | ICD-10-CM

## 2022-07-12 PROCEDURE — 99202 OFFICE O/P NEW SF 15 MIN: CPT | Performed by: SURGERY
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
## 2022-07-14 ENCOUNTER — PATIENT ROUNDING (BHMG ONLY) (OUTPATIENT)
Dept: SURGERY | Facility: CLINIC | Age: 22
End: 2022-07-14

## 2022-07-14 NOTE — PROGRESS NOTES
July 14, 2022    Hello, may I speak with Valerie Minor?    My name is Adrianna Aguilar      I am  with [facility]  [address]  [place] [address] [phone].    Before we get started may I verify your date of birth? 2000    I am calling to officially welcome you to our practice and ask about your recent visit. Is this a good time to talk? yes    Tell me about your visit with us. What things went well?  Her visit went fine.       We're always looking for ways to make our patients' experiences even better. Do you have recommendations on ways we may improve?  no    Overall were you satisfied with your first visit to our practice? yes       I appreciate you taking the time to speak with me today. Is there anything else I can do for you? no      Thank you, and have a great day.

## 2022-07-20 ENCOUNTER — PROCEDURE VISIT (OUTPATIENT)
Dept: SURGERY | Facility: CLINIC | Age: 22
End: 2022-07-20

## 2022-07-20 DIAGNOSIS — L72.3 INFECTED SEBACEOUS CYST: Primary | ICD-10-CM

## 2022-07-20 DIAGNOSIS — L08.9 INFECTED SEBACEOUS CYST: Primary | ICD-10-CM

## 2022-07-20 PROCEDURE — 11401 EXC TR-EXT B9+MARG 0.6-1 CM: CPT | Performed by: SURGERY

## 2022-07-20 NOTE — PROGRESS NOTES
Patient presents for procedure to exc cyst   The site has improved.  Was prepped draped locally infiltrated with 1% lidocaine without epinephrine.  A total of 7 cc was used.  It was elliptically excised to 6 mm.  She tolerated the procedure well.  The skin was closed in interrupted simple fashion with use of 4-0 nylon.  Wound was cleaned and dried and sterilely dressed.  I will see her back in the office in a week

## 2022-07-22 LAB
DX ICD CODE: NORMAL
DX ICD CODE: NORMAL
PATH REPORT.FINAL DX SPEC: NORMAL
PATH REPORT.GROSS SPEC: NORMAL
PATH REPORT.SITE OF ORIGIN SPEC: NORMAL
PATHOLOGIST NAME: NORMAL
PAYMENT PROCEDURE: NORMAL

## 2022-07-27 ENCOUNTER — TELEPHONE (OUTPATIENT)
Dept: INTERNAL MEDICINE | Facility: CLINIC | Age: 22
End: 2022-07-27

## 2022-07-27 NOTE — TELEPHONE ENCOUNTER
Caller: Valerie Minor    Relationship: Self    Best call back number: [phone]   What is the best time to reach you: ANY TIME     Who are you requesting to speak with (clinical staff, provider,  specific staff member): CLINICAL    What was the call regarding: PATIENT STATES SHE SAW A GENERAL SURGEON AND 7 DAYS AGO HAD  A PROCEDURE ON HER UPPER THIGH/BUTT CHEEK AREA WITH STITCHES PLACED.  PATIENT STATES SHE IS UNABLE TODAY TO GET TO THE GENERAL SURGEON'S OFFICE, AND SHE WAS TOLD THAT THE SURGEON WAS GOING OUT OF TOWN.  PATIENT WOULD LIKE A CALLBACK FROM THE OFFICE TO LET HER KNOW IF SHE CAN COME IN AND HAVE HER SUTURES REMOVED.      Do you require a callback: YES

## 2022-07-29 ENCOUNTER — OFFICE VISIT (OUTPATIENT)
Dept: SURGERY | Facility: CLINIC | Age: 22
End: 2022-07-29

## 2022-07-29 DIAGNOSIS — Z98.890 STATUS POST EXCISIONAL BIOPSY: Primary | ICD-10-CM

## 2022-07-29 PROCEDURE — 99024 POSTOP FOLLOW-UP VISIT: CPT | Performed by: SURGERY
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
## 2022-11-29 ENCOUNTER — OFFICE VISIT (OUTPATIENT)
Dept: INTERNAL MEDICINE | Facility: CLINIC | Age: 22
End: 2022-11-29

## 2022-11-29 VITALS
SYSTOLIC BLOOD PRESSURE: 99 MMHG | HEART RATE: 94 BPM | BODY MASS INDEX: 18.01 KG/M2 | TEMPERATURE: 97.9 F | OXYGEN SATURATION: 99 % | RESPIRATION RATE: 17 BRPM | HEIGHT: 67 IN | DIASTOLIC BLOOD PRESSURE: 60 MMHG

## 2022-11-29 DIAGNOSIS — R09.81 NASAL CONGESTION: Primary | ICD-10-CM

## 2022-11-29 DIAGNOSIS — U07.1 COVID-19 VIRUS DETECTED: ICD-10-CM

## 2022-11-29 LAB
EXPIRATION DATE: ABNORMAL
FLUAV AG UPPER RESP QL IA.RAPID: NOT DETECTED
FLUBV AG UPPER RESP QL IA.RAPID: NOT DETECTED
INTERNAL CONTROL: ABNORMAL
Lab: ABNORMAL
SARS-COV-2 RNA RESP QL NAA+PROBE: DETECTED

## 2022-11-29 PROCEDURE — 99214 OFFICE O/P EST MOD 30 MIN: CPT | Performed by: INTERNAL MEDICINE

## 2022-11-29 PROCEDURE — 87428 SARSCOV & INF VIR A&B AG IA: CPT | Performed by: INTERNAL MEDICINE

## 2022-11-29 RX ORDER — AZELASTINE 1 MG/ML
2 SPRAY, METERED NASAL 2 TIMES DAILY
Qty: 30 ML | Refills: 1 | Status: SHIPPED | OUTPATIENT
Start: 2022-11-29

## 2022-11-29 NOTE — PROGRESS NOTES
"Chief Complaint  Generalized Body Aches (Started sunday), Cough (Started sunday), and Fever (Body was very hot, didn't get no temp, sweating)    Subjective        Valerie Minor presents to [facility]  History of Present Illness  Symptoms started on Friday, feeling off, then layed in bed all weekend. Brother with similar symptoms, started on thanksgiving. Progressed to cough, fevers, body aches. Not taking anything currently. Works as a .      Objective   Vital Signs:  BP 99/60 (BP Location: Left arm, Patient Position: Sitting, Cuff Size: Adult)   Pulse 94   Temp 97.9 °F (36.6 °C) (Oral)   Resp 17   Ht 170.2 cm (67\")   SpO2 99%   BMI 18.01 kg/m²   Estimated body mass index is 18.01 kg/m² as calculated from the following:    Height as of this encounter: 170.2 cm (67\").    Weight as of 10/30/22: 52.2 kg (115 lb).    Physical Exam  Vitals and nursing note reviewed.   Constitutional:       General: She is not in acute distress.     Appearance: Normal appearance.   HENT:      Head: Normocephalic and atraumatic.      Right Ear: Ear canal and external ear normal.      Left Ear: Ear canal and external ear normal.      Ears:      Comments: Bilateral serous effusions     Nose: Congestion and rhinorrhea present.      Mouth/Throat:      Mouth: Mucous membranes are moist.      Pharynx: No oropharyngeal exudate or posterior oropharyngeal erythema.   Eyes:      General:         Right eye: No discharge.         Left eye: No discharge.      Extraocular Movements: Extraocular movements intact.      Conjunctiva/sclera: Conjunctivae normal.      Pupils: Pupils are equal, round, and reactive to light.   Cardiovascular:      Rate and Rhythm: Normal rate and regular rhythm.      Pulses: Normal pulses.      Heart sounds: Normal heart sounds. No murmur heard.  Pulmonary:      Effort: Pulmonary effort is normal. No respiratory distress.      Breath sounds: Normal breath " sounds. No wheezing or rales.   Abdominal:      General: Abdomen is flat. Bowel sounds are normal. There is no distension.      Palpations: Abdomen is soft.      Tenderness: There is no abdominal tenderness.   Musculoskeletal:      Cervical back: Normal range of motion and neck supple. No rigidity.   Lymphadenopathy:      Cervical: No cervical adenopathy.   Skin:     General: Skin is warm.      Capillary Refill: Capillary refill takes less than 2 seconds.   Neurological:      General: No focal deficit present.      Mental Status: She is alert and oriented to person, place, and time. Mental status is at baseline.   Psychiatric:         Mood and Affect: Mood normal.         Behavior: Behavior normal.         Thought Content: Thought content normal.        Result Review :  The following data was reviewed by: Wilmer Aguilar MD on 11/29/2022:  Common labs    Common Labs 7/7/22 7/7/22    1618 1618   Glucose  84   BUN  10   Creatinine  0.88   Sodium  141   Potassium  4.1   Chloride  105   Calcium  9.6   Total Protein  7.1   Albumin  4.9   Total Bilirubin  0.3   Alkaline Phosphatase  61   AST (SGOT)  11   ALT (SGPT)  8   WBC 10.6    Hemoglobin 12.8    Hematocrit 37.7    Platelets 398            Data reviewed: prior office notes reviewed     covid positive     Assessment and Plan      Valerie Minor is a 22 y.o. female presenting with flu like symptoms, flu testing negative, covid testing positive. No risk factors for disease progression, would not benefit from paxlovid. Continue supportive measures, tylenol/ibuprofen for fevers/chills, astelin and sudafed for nasal congestion/sinus pressure. rtc precautions discussed.    Diagnoses and all orders for this visit:    1. Nasal congestion (Primary)  -     azelastine (ASTELIN) 0.1 % nasal spray; 2 sprays into the nostril(s) as directed by provider 2 (Two) Times a Day. Use in each nostril as directed  Dispense: 30 mL; Refill: 1    2. COVID-19 virus detected  -     Covid-19 + Flu A&B  Iraj NEWBERRY    I spent 30 minutes caring for Valerie on this date of service. This time includes time spent by me in the following activities:preparing for the visit, reviewing tests, obtaining and/or reviewing a separately obtained history, performing a medically appropriate examination and/or evaluation , counseling and educating the patient/family/caregiver, ordering medications, tests, or procedures and documenting information in the medical record  Follow Up {Instructions Charge Capture  Follow-up Communications :23}  Return for Next scheduled follow up.  Patient was given instructions and counseling regarding her condition or for health maintenance advice. Please see specific information pulled into the AVS if appropriate.

## 2023-01-19 ENCOUNTER — OFFICE VISIT (OUTPATIENT)
Dept: INTERNAL MEDICINE | Facility: CLINIC | Age: 23
End: 2023-01-19
Payer: COMMERCIAL

## 2023-01-19 VITALS
TEMPERATURE: 98.2 F | OXYGEN SATURATION: 99 % | BODY MASS INDEX: 18.08 KG/M2 | RESPIRATION RATE: 16 BRPM | HEART RATE: 71 BPM | HEIGHT: 67 IN | SYSTOLIC BLOOD PRESSURE: 116 MMHG | DIASTOLIC BLOOD PRESSURE: 68 MMHG | WEIGHT: 115.2 LBS

## 2023-01-19 DIAGNOSIS — S13.4XXA WHIPLASH INJURY TO NECK, INITIAL ENCOUNTER: Primary | ICD-10-CM

## 2023-01-19 PROCEDURE — 99214 OFFICE O/P EST MOD 30 MIN: CPT | Performed by: INTERNAL MEDICINE
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
## 2023-02-10 ENCOUNTER — OFFICE VISIT (OUTPATIENT)
Dept: INTERNAL MEDICINE | Facility: CLINIC | Age: 23
End: 2023-02-10
Payer: COMMERCIAL

## 2023-02-10 VITALS
RESPIRATION RATE: 16 BRPM | HEART RATE: 74 BPM | BODY MASS INDEX: 18.01 KG/M2 | DIASTOLIC BLOOD PRESSURE: 76 MMHG | WEIGHT: 115 LBS | OXYGEN SATURATION: 99 % | SYSTOLIC BLOOD PRESSURE: 118 MMHG

## 2023-02-10 DIAGNOSIS — F41.1 GENERALIZED ANXIETY DISORDER: ICD-10-CM

## 2023-02-10 DIAGNOSIS — J01.00 ACUTE NON-RECURRENT MAXILLARY SINUSITIS: Primary | ICD-10-CM

## 2023-02-10 PROCEDURE — 99214 OFFICE O/P EST MOD 30 MIN: CPT | Performed by: INTERNAL MEDICINE

## 2023-02-10 RX ORDER — AMOXICILLIN AND CLAVULANATE POTASSIUM 875; 125 MG/1; MG/1
1 TABLET, FILM COATED ORAL 2 TIMES DAILY
Qty: 20 TABLET | Refills: 0 | Status: SHIPPED | OUTPATIENT
Start: 2023-02-10 | End: 2023-02-20

## 2023-02-10 NOTE — PROGRESS NOTES
"Chief Complaint  Sore Throat    Subjective        Valerie Minor presents to [facility]  History of Present Illness  Here with sinus pressure/congestion and sore throat for last week. Fevers earlier in the week. Resolved now. Taking otc meds w/o much relief. A lot of maxillary sinus pressure, thick nasal discharge. 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     Ongoing anxiety problems. Previously treated with zoloft and tolerated well but weaned herself off previously. Feels anxiety is much worse right now and would like to restart. Not much coexistent depressive symptoms.     Objective   Vital Signs:  /76   Pulse 74   Resp 16   Wt 52.2 kg (115 lb)   SpO2 99%   BMI 18.01 kg/m²   Estimated body mass index is 18.01 kg/m² as calculated from the following:    Height as of 1/19/23: 170.2 cm (67\").    Weight as of this encounter: 52.2 kg (115 lb).     Physical Exam  Vitals and nursing note reviewed.   Constitutional:       General: She is not in acute distress.     Appearance: Normal appearance. She is not ill-appearing.   HENT:      Head: Normocephalic and atraumatic.      Comments: Maxillary sinus pressure     Right Ear: Tympanic membrane, ear canal and external ear normal. There is no impacted cerumen.      Left Ear: Tympanic membrane, ear canal and external ear normal. There is no impacted cerumen.      Nose: Congestion present.   Eyes:      General:         Right eye: No discharge.         Left eye: No discharge.      Extraocular Movements: Extraocular movements intact.      Conjunctiva/sclera: Conjunctivae normal.      Pupils: Pupils are equal, round, and reactive to light.   Cardiovascular:      Rate and Rhythm: Normal rate and regular rhythm.      Pulses: Normal pulses.      Heart sounds: Normal heart sounds. No murmur heard.  Pulmonary:      Effort: Pulmonary effort is normal.      Breath sounds: Normal breath sounds. No wheezing or rales.   Neurological:    "   General: No focal deficit present.      Mental Status: She is alert and oriented to person, place, and time. Mental status is at baseline.      Cranial Nerves: No cranial nerve deficit.   Psychiatric:         Mood and Affect: Mood normal.         Behavior: Behavior normal.         Thought Content: Thought content normal.        Result Review :  The following data was reviewed by: Wilmer Aguilar MD on 02/10/2023:  Common labs    Common Labs 7/7/22 7/7/22    1618 1618   Glucose  84   BUN  10   Creatinine  0.88   Sodium  141   Potassium  4.1   Chloride  105   Calcium  9.6   Total Protein  7.1   Albumin  4.9   Total Bilirubin  0.3   Alkaline Phosphatase  61   AST (SGOT)  11   ALT (SGPT)  8   WBC 10.6    Hemoglobin 12.8    Hematocrit 37.7    Platelets 398            Data reviewed: prior office notes reviewed             Assessment and Plan      Valerie Minor is a 23 y.o. female presenting with symptoms c/w acute bacterial sinusitis, start augmentin as below. Ongoing issues with anxiety, previously took zoloft with good result, restart with 25mg daily x1 week then can increase to 50mg daily thereafter. F/u 4-6 weeks.     Diagnoses and all orders for this visit:    1. Acute non-recurrent maxillary sinusitis (Primary)  -     amoxicillin-clavulanate (Augmentin) 875-125 MG per tablet; Take 1 tablet by mouth 2 (Two) Times a Day for 10 days.  Dispense: 20 tablet; Refill: 0    2. Generalized anxiety disorder  -     sertraline (Zoloft) 50 MG tablet; Take 1 tablet by mouth Daily.  Dispense: 30 tablet; Refill: 1      I spent 30 minutes caring for Valerie on this date of service. This time includes time spent by me in the following activities:preparing for the visit, reviewing tests, obtaining and/or reviewing a separately obtained history, performing a medically appropriate examination and/or evaluation , counseling and educating the patient/family/caregiver, ordering medications, tests, or procedures and documenting information in  the medical record  Follow Up   Return in about 4 weeks (around 3/10/2023) for Recheck.  Patient was given instructions and counseling regarding her condition or for health maintenance advice. Please see specific information pulled into the AVS if appropriate.

## 2023-08-14 ENCOUNTER — OFFICE VISIT (OUTPATIENT)
Dept: INTERNAL MEDICINE | Facility: CLINIC | Age: 23
End: 2023-08-14
Payer: COMMERCIAL

## 2023-08-14 VITALS
HEIGHT: 67 IN | HEART RATE: 66 BPM | DIASTOLIC BLOOD PRESSURE: 64 MMHG | OXYGEN SATURATION: 99 % | SYSTOLIC BLOOD PRESSURE: 108 MMHG | RESPIRATION RATE: 16 BRPM | TEMPERATURE: 98.2 F | WEIGHT: 121 LBS | BODY MASS INDEX: 18.99 KG/M2

## 2023-08-14 DIAGNOSIS — J01.40 ACUTE NON-RECURRENT PANSINUSITIS: Primary | ICD-10-CM

## 2023-08-14 DIAGNOSIS — F41.9 ANXIETY: Chronic | ICD-10-CM

## 2023-08-14 DIAGNOSIS — L72.3 SEBACEOUS CYST OF EAR: ICD-10-CM

## 2023-08-14 PROCEDURE — 99214 OFFICE O/P EST MOD 30 MIN: CPT | Performed by: INTERNAL MEDICINE

## 2023-08-14 RX ORDER — AMOXICILLIN AND CLAVULANATE POTASSIUM 875; 125 MG/1; MG/1
1 TABLET, FILM COATED ORAL 2 TIMES DAILY
Qty: 14 TABLET | Refills: 0 | Status: SHIPPED | OUTPATIENT
Start: 2023-08-14 | End: 2023-08-21

## 2023-08-14 RX ORDER — PROPRANOLOL HYDROCHLORIDE 60 MG/1
60 TABLET ORAL 3 TIMES DAILY PRN
Qty: 90 TABLET | Refills: 0 | Status: SHIPPED | OUTPATIENT
Start: 2023-08-14

## 2023-08-14 NOTE — ASSESSMENT & PLAN NOTE
UNCONTROLLED  - tried sertraline but developed night sweats and was not able to tolerate  - largest trigger now is her drive to work in the morning  - will send Rx for propranolol for prn use before school

## 2023-08-14 NOTE — PROGRESS NOTES
"Chief Complaint  Mass (Behind ear)    Subjective          Valerie Minor presents to [facility] for mass behind her R ear. Notes it has been there for a while but seems to be more bothersome. Not painful but has been coming and going.   Does think she has a sinus infection as well. Sinus pressure x weeks at this time that she can't make resolve.     Objective   Vital Signs:     /64   Pulse 66   Temp 98.2 øF (36.8 øC)   Resp 16   Ht 170.2 cm (67\")   Wt 54.9 kg (121 lb)   SpO2 99%   BMI 18.95 kg/mý     Physical Exam  Vitals and nursing note reviewed.   Constitutional:       General: She is not in acute distress.     Appearance: Normal appearance.   HENT:      Right Ear: Ear canal and external ear normal. A middle ear effusion is present. Tympanic membrane is not erythematous.      Left Ear: Ear canal and external ear normal. A middle ear effusion is present. Tympanic membrane is not erythematous.      Ears:      Comments: R ear with 1 cm x 1 cm fluctuant, mobile cystic nodule behind earlobe     Nose: Congestion present.      Right Sinus: Maxillary sinus tenderness and frontal sinus tenderness present.      Left Sinus: Maxillary sinus tenderness and frontal sinus tenderness present.      Mouth/Throat:      Pharynx: Pharyngeal swelling and posterior oropharyngeal erythema present. No oropharyngeal exudate.      Comments: + cobbelstoning  Cardiovascular:      Rate and Rhythm: Normal rate and regular rhythm.      Pulses: Normal pulses.      Heart sounds: Normal heart sounds. No murmur heard.  Pulmonary:      Effort: Pulmonary effort is normal. No respiratory distress.      Breath sounds: Normal breath sounds.   Abdominal:      General: Bowel sounds are normal. There is no distension.      Palpations: Abdomen is soft.   Lymphadenopathy:      Cervical: Cervical adenopathy present.   Skin:     General: Skin is warm.      Capillary Refill: Capillary refill takes " less than 2 seconds.   Neurological:      Mental Status: She is alert and oriented to person, place, and time. Mental status is at baseline.        Result Review : : None       Assessment and Plan      Diagnoses and all orders for this visit:    1. Acute non-recurrent pansinusitis (Primary)  - Augmentin as below for acute sinusitis  - increase fluids and rest  - cont OTC meds like tylenol and ibuprofen as needed for fever/pain  - should take OTC antihistamine, nasal corticosteroid, and decongestant  - call back if not improving after 48-72 hours       Orders:  -     amoxicillin-clavulanate (AUGMENTIN) 875-125 MG per tablet; Take 1 tablet by mouth 2 (Two) Times a Day for 7 days.  Dispense: 14 tablet; Refill: 0    2. Sebaceous cyst of ear  -supportive care only, no intervention required    3. Anxiety  Assessment & Plan:  UNCONTROLLED  - tried sertraline but developed night sweats and was not able to tolerate  - largest trigger now is her drive to work in the morning  - will send Rx for propranolol for prn use before school    Orders:  -     propranolol (INDERAL) 60 MG tablet; Take 1 tablet by mouth 3 (Three) Times a Day As Needed (anxiety).  Dispense: 90 tablet; Refill: 0          Follow Up   Return if symptoms worsen or fail to improve.    Patient was given instructions and counseling regarding her condition or for health maintenance advice. Please see specific information pulled into the AVS if appropriate.     Lennie Riggins MD  [facility]  Phone: [phone]  Fax: [phone]

## 2023-09-12 DIAGNOSIS — F41.9 ANXIETY: Chronic | ICD-10-CM

## 2023-09-12 RX ORDER — PROPRANOLOL HYDROCHLORIDE 60 MG/1
60 TABLET ORAL 3 TIMES DAILY PRN
Qty: 90 TABLET | Refills: 1 | Status: SHIPPED | OUTPATIENT
Start: 2023-09-12

## 2023-09-12 NOTE — TELEPHONE ENCOUNTER
Rx Refill Note  Requested Prescriptions     Pending Prescriptions Disp Refills    propranolol (INDERAL) 60 MG tablet [Pharmacy Med Name: PROPRANOLOL 60 MG TABLET] 90 tablet 0     Sig: TAKE 1 TABLET BY MOUTH 3 (THREE) TIMES A DAY AS NEEDED (ANXIETY).      Last office visit with prescribing clinician: 8/14/2023   Last telemedicine visit with prescribing clinician: Visit date not found   Next office visit with prescribing clinician: Visit date not found                         Would you like a call back once the refill request has been completed: [] Yes [] No    If the office needs to give you a call back, can they leave a voicemail: [] Yes [] No    Carolyn Pierce MA  09/12/23, 07:28 EDT

## 2023-09-20 ENCOUNTER — OFFICE VISIT (OUTPATIENT)
Dept: INTERNAL MEDICINE | Facility: CLINIC | Age: 23
End: 2023-09-20
Payer: COMMERCIAL

## 2023-09-20 VITALS
RESPIRATION RATE: 16 BRPM | BODY MASS INDEX: 18.68 KG/M2 | TEMPERATURE: 97.1 F | HEIGHT: 67 IN | HEART RATE: 65 BPM | WEIGHT: 119 LBS | OXYGEN SATURATION: 99 %

## 2023-09-20 DIAGNOSIS — R05.9 COUGH, UNSPECIFIED TYPE: Primary | ICD-10-CM

## 2023-09-20 DIAGNOSIS — J06.9 ACUTE URI: ICD-10-CM

## 2023-09-20 LAB
EXPIRATION DATE: NORMAL
FLUAV AG UPPER RESP QL IA.RAPID: NOT DETECTED
FLUBV AG UPPER RESP QL IA.RAPID: NOT DETECTED
INTERNAL CONTROL: NORMAL
Lab: NORMAL
SARS-COV-2 AG UPPER RESP QL IA.RAPID: NOT DETECTED

## 2023-09-20 PROCEDURE — 87428 SARSCOV & INF VIR A&B AG IA: CPT | Performed by: INTERNAL MEDICINE

## 2023-09-20 PROCEDURE — 99213 OFFICE O/P EST LOW 20 MIN: CPT | Performed by: INTERNAL MEDICINE

## 2023-09-20 NOTE — PROGRESS NOTES
"Chief Complaint  Cough, Nasal Congestion, and Fatigue    Subjective        Valerie Minor presents to [facility]  History of Present Illness  Congestion runny nose cough and myalgias since Sunday.  She was at a concert prior to getting sick.  She did a COVID test at home that was negative yesterday.  She actually feels better today.  She is taking Tylenol and ibuprofen and Stahist.  No shortness of breath or lower respiratory tract involvement  Objective   Vital Signs:  Pulse 65   Temp 97.1 °F (36.2 °C) (Temporal)   Resp 16   Ht 170.2 cm (67\")   Wt 54 kg (119 lb)   SpO2 99%   BMI 18.64 kg/m²   Estimated body mass index is 18.64 kg/m² as calculated from the following:    Height as of this encounter: 170.2 cm (67\").    Weight as of this encounter: 54 kg (119 lb).       BMI is within normal parameters. No other follow-up for BMI required.      Physical Exam  Constitutional:       Appearance: Normal appearance.   HENT:      Head: Normocephalic and atraumatic.      Right Ear: Tympanic membrane and ear canal normal.      Left Ear: Tympanic membrane and ear canal normal.      Nose: Congestion present.      Mouth/Throat:      Pharynx: Oropharynx is clear. No oropharyngeal exudate or posterior oropharyngeal erythema.   Eyes:      Pupils: Pupils are equal, round, and reactive to light.   Cardiovascular:      Rate and Rhythm: Normal rate and regular rhythm.      Pulses: Normal pulses.      Heart sounds: Normal heart sounds. No murmur heard.  Pulmonary:      Effort: Pulmonary effort is normal. No respiratory distress.      Breath sounds: Normal breath sounds. No stridor. No wheezing.   Abdominal:      General: Abdomen is flat.      Palpations: Abdomen is soft.   Musculoskeletal:      Cervical back: Normal range of motion and neck supple.      Right lower leg: No edema.      Left lower leg: No edema.   Skin:     General: Skin is warm and dry.      Capillary Refill: " Capillary refill takes less than 2 seconds.   Neurological:      General: No focal deficit present.      Mental Status: She is alert and oriented to person, place, and time.      Result Review :                   Assessment and Plan   Diagnoses and all orders for this visit:    1. Cough, unspecified type (Primary)  -     Covid-19 + Flu A&B AG, Veritor    2. Acute URI    URI with negative COVID testing and flu testing.  She feels better today.  Continue with supportive therapy and follow-up if any problems.  I will think she has a bacterial sinusitis based on the 3 days duration but she does apparently have a history of sinusitis in the past and was going to see ENT anyway on Friday.         Follow Up   No follow-ups on file.  Patient was given instructions and counseling regarding her condition or for health maintenance advice. Please see specific information pulled into the AVS if appropriate.

## 2024-01-15 ENCOUNTER — TELEMEDICINE (OUTPATIENT)
Dept: INTERNAL MEDICINE | Facility: CLINIC | Age: 24
End: 2024-01-15
Payer: COMMERCIAL

## 2024-01-15 DIAGNOSIS — J02.9 ACUTE PHARYNGITIS, UNSPECIFIED ETIOLOGY: ICD-10-CM

## 2024-01-15 DIAGNOSIS — J06.9 ACUTE URI: Primary | ICD-10-CM

## 2024-01-15 PROCEDURE — 99213 OFFICE O/P EST LOW 20 MIN: CPT | Performed by: INTERNAL MEDICINE

## 2024-01-15 RX ORDER — AZITHROMYCIN 250 MG/1
TABLET, FILM COATED ORAL
Qty: 6 TABLET | Refills: 0 | Status: SHIPPED | OUTPATIENT
Start: 2024-01-15

## 2024-01-15 NOTE — PROGRESS NOTES
"Chief Complaint  No chief complaint on file.    Subjective        Valerie Minor presents to [facility]  History of Present Illness    Objective   Vital Signs:  There were no vitals taken for this visit.  Estimated body mass index is 18.64 kg/m² as calculated from the following:    Height as of 9/20/23: 170.2 cm (67\").    Weight as of 9/20/23: 54 kg (119 lb).       BMI is within normal parameters. No other follow-up for BMI required.      Physical Exam   Result Review :                   Assessment and Plan   There are no diagnoses linked to this encounter.         Follow Up   No follow-ups on file.  Patient was given instructions and counseling regarding her condition or for health maintenance advice. Please see specific information pulled into the AVS if appropriate.         "

## 2024-01-15 NOTE — PROGRESS NOTES
"Chief Complaint  No chief complaint on file.    Subjective        Valerie Minor presents to [facility]  History of Present Illness  congestion, sinus drainage and postnasal drainage for 3 to 4 days.  Complains of a sore throat mainly that comes off and on.  She was not tested for anything to this point but she had COVID about 4 weeks ago.  She had a recent procedure on a deviated septum and has had more drainage than normal.  She had some bodyaches over the past few days but that is actually better today.You have chosen to receive care through a telehealth visit.  Do you consent to use a video/audio connection for your medical care today? Yes   Objective   Vital Signs:  There were no vitals taken for this visit.  Estimated body mass index is 18.64 kg/m² as calculated from the following:    Height as of 9/20/23: 170.2 cm (67\").    Weight as of 9/20/23: 54 kg (119 lb).       BMI is within normal parameters. No other follow-up for BMI required.      Physical Exam very nice lady does not appear to be any significant distress.  Result Review :                   Assessment and Plan   Diagnoses and all orders for this visit:    1. Acute URI (Primary)    2. Acute pharyngitis, unspecified etiology    Other orders  -     azithromycin (Zithromax Z-Zbigniew) 250 MG tablet; Take 2 tablets by mouth on day 1, then 1 tablet daily on days 2-5  Dispense: 6 tablet; Refill: 0    URI and pharyngitis symptoms.  She said the symptoms started Thursday and may be a little bit better today.  Symptomatic treatment.  I sent in a prescription of Zithromax if it did not get better tomorrow go ahead and fill that.  I suspect there is a chance this will still improve on its own.  Follow-up if any problems or any worsening of symptoms         Follow Up   No follow-ups on file.  Patient was given instructions and counseling regarding her condition or for health maintenance advice. Please see specific " information pulled into the AVS if appropriate.

## 2024-09-03 ENCOUNTER — TELEPHONE (OUTPATIENT)
Dept: INTERNAL MEDICINE | Facility: CLINIC | Age: 24
End: 2024-09-03

## 2024-09-03 NOTE — TELEPHONE ENCOUNTER
I s/w Valerie and let her know that she'll need to go to Urgent Care per Narda.  She shouldn't have been scheduled with Patricia.  Patient agreed to go to Urgent Care

## 2024-10-23 ENCOUNTER — TELEPHONE (OUTPATIENT)
Dept: URGENT CARE | Facility: CLINIC | Age: 24
End: 2024-10-23
Payer: COMMERCIAL

## 2024-10-23 DIAGNOSIS — J06.9 URTI (ACUTE UPPER RESPIRATORY INFECTION): Primary | ICD-10-CM

## 2024-10-23 RX ORDER — AZITHROMYCIN 250 MG/1
TABLET, FILM COATED ORAL
Qty: 6 TABLET | Refills: 0 | Status: SHIPPED | OUTPATIENT
Start: 2024-10-23

## 2024-10-23 RX ORDER — DEXTROMETHORPHAN HYDROBROMIDE AND PROMETHAZINE HYDROCHLORIDE 15; 6.25 MG/5ML; MG/5ML
SYRUP ORAL
Qty: 240 ML | Refills: 0 | Status: SHIPPED | OUTPATIENT
Start: 2024-10-23

## 2024-10-23 NOTE — TELEPHONE ENCOUNTER
D/w pt.  Cough, congestion persist from ov here 9/20/24; no new c/o.  P - zpak, phen-dm; o/w same.

## 2024-10-29 ENCOUNTER — TELEPHONE (OUTPATIENT)
Dept: URGENT CARE | Facility: CLINIC | Age: 24
End: 2024-10-29
Payer: COMMERCIAL

## 2024-10-29 DIAGNOSIS — J06.9 URTI (ACUTE UPPER RESPIRATORY INFECTION): ICD-10-CM

## 2024-10-29 RX ORDER — AZITHROMYCIN 250 MG/1
250 TABLET, FILM COATED ORAL DAILY
Qty: 2 TABLET | Refills: 0 | Status: SHIPPED | OUTPATIENT
Start: 2024-10-29

## (undated) DEVICE — ENDO. PORT CONNECTOR W/VALVE FOR OLYMPUS® SCOPES: Brand: ERBE

## (undated) DEVICE — VIAL FORMALIN CAP 10P 40ML

## (undated) DEVICE — Device: Brand: DEFENDO AIR/WATER/SUCTION AND BIOPSY VALVE

## (undated) DEVICE — SPNG GZ WOVN 4X4IN 12PLY 10/BX STRL

## (undated) DEVICE — THE BITE BLOCK MAXI, LATEX FREE STRAP IS USED TO PROTECT THE ENDOSCOPE INSERTION TUBE FROM BEING BITTEN BY THE PATIENT.

## (undated) DEVICE — FRCP BX RADJAW4 NDL 2.8 240CM LG OG BX40

## (undated) DEVICE — JACKT LAB KNIT COLR LG BLU

## (undated) DEVICE — GLV SURG SENSICARE MICRO PF LF 6 STRL

## (undated) DEVICE — GOWN ISOL W/THUMB UNIV BLU BX/15

## (undated) DEVICE — SYR LL 3CC

## (undated) DEVICE — SUCTION CANISTER, 3000CC,SAFELINER: Brand: DEROYAL

## (undated) DEVICE — BW-412T DISP COMBO CLEANING BRUSH: Brand: SINGLE USE COMBINATION CLEANING BRUSH

## (undated) DEVICE — MASK,FACE,FLUID RES,SHLD,FOGFREE,TIES: Brand: MEDLINE

## (undated) DEVICE — MASK,FACE,FLUID RESIST,SHLD,EARLOOP: Brand: MEDLINE

## (undated) DEVICE — Device